# Patient Record
Sex: FEMALE | Race: WHITE | NOT HISPANIC OR LATINO | Employment: UNEMPLOYED | URBAN - METROPOLITAN AREA
[De-identification: names, ages, dates, MRNs, and addresses within clinical notes are randomized per-mention and may not be internally consistent; named-entity substitution may affect disease eponyms.]

---

## 2017-06-15 ENCOUNTER — APPOINTMENT (OUTPATIENT)
Dept: RADIOLOGY | Facility: IMAGING CENTER | Age: 52
End: 2017-06-15
Attending: PHYSICIAN ASSISTANT
Payer: COMMERCIAL

## 2017-06-15 ENCOUNTER — OFFICE VISIT (OUTPATIENT)
Dept: URGENT CARE | Facility: CLINIC | Age: 52
End: 2017-06-15
Payer: COMMERCIAL

## 2017-06-15 VITALS
WEIGHT: 221 LBS | DIASTOLIC BLOOD PRESSURE: 82 MMHG | SYSTOLIC BLOOD PRESSURE: 114 MMHG | OXYGEN SATURATION: 97 % | RESPIRATION RATE: 16 BRPM | HEART RATE: 95 BPM | BODY MASS INDEX: 33.61 KG/M2 | TEMPERATURE: 99 F

## 2017-06-15 DIAGNOSIS — S92.424A CLOSED NONDISPLACED FRACTURE OF DISTAL PHALANX OF RIGHT GREAT TOE, INITIAL ENCOUNTER: ICD-10-CM

## 2017-06-15 DIAGNOSIS — S96.919A STRAIN OF FOOT, UNSPECIFIED LATERALITY, INITIAL ENCOUNTER: ICD-10-CM

## 2017-06-15 PROCEDURE — L9900 O&P SUPPLY/ACCESSORY/SERVICE: HCPCS | Performed by: PHYSICIAN ASSISTANT

## 2017-06-15 PROCEDURE — 73630 X-RAY EXAM OF FOOT: CPT | Mod: 26,RT | Performed by: PHYSICIAN ASSISTANT

## 2017-06-15 PROCEDURE — 99203 OFFICE O/P NEW LOW 30 MIN: CPT | Performed by: PHYSICIAN ASSISTANT

## 2017-06-15 RX ORDER — ERGOCALCIFEROL 1.25 MG/1
CAPSULE ORAL
COMMUNITY

## 2017-06-15 RX ORDER — MINOCYCLINE HYDROCHLORIDE 100 MG/1
100 CAPSULE ORAL 2 TIMES DAILY
COMMUNITY

## 2017-06-15 RX ORDER — OMEPRAZOLE 20 MG/1
20 CAPSULE, DELAYED RELEASE ORAL DAILY
COMMUNITY

## 2017-06-15 ASSESSMENT — ENCOUNTER SYMPTOMS
TINGLING: 0
VOMITING: 0
FEVER: 0
SENSORY CHANGE: 0
CHILLS: 0
NAUSEA: 0

## 2017-06-15 NOTE — MR AVS SNAPSHOT
Shaq Lane   6/15/2017 6:30 PM   Office Visit   MRN: [id number]    Department:  [facility]   Dept Phone:  [phone]    Description:  Female : 1965   Provider:  Ramos Mcgraw PA-C           Reason for Visit     Toe Injury x today,   Rt. toe injury      Allergies as of 6/15/2017     Allergen Noted Reactions    Amoxicillin 2015   Unspecified    Stomach pain   TOLERATED AMPICILLIN in the past    Vicodin [Hydrocodone-Acetaminophen] 2015   Rash    rash      You were diagnosed with     Strain of foot, unspecified laterality, initial encounter   [242081]         Vital Signs     Blood Pressure Pulse Temperature Respirations Weight Oxygen Saturation    114/82 mmHg 95 37.2 °C (99 °F) 16 100.245 kg (221 lb) 97%    Smoking Status                   Never Smoker            Basic Information     Date Of Birth Sex Race Ethnicity Preferred Language    1965 Female White Non- English      Problem List              ICD-10-CM Priority Class Noted - Resolved    Chest pain R07.9   2015 - Present      Health Maintenance     Patient has no pending health maintenance at this time      Current Immunizations     No immunizations on file.      Below and/or attached are the medications your provider expects you to take. Review all of your home medications and newly ordered medications with your provider and/or pharmacist. Follow medication instructions as directed by your provider and/or pharmacist. Please keep your medication list with you and share with your provider. Update the information when medications are discontinued, doses are changed, or new medications (including over-the-counter products) are added; and carry medication information at all times in the event of emergency situations     Allergies:  AMOXICILLIN - Unspecified     VICODIN - Rash               Medications  Valid as of: Cris 15, 2017 -  7:20 PM    Generic Name Brand Name Tablet Size Instructions for use      Clindamycin HCl   Take  by mouth.        Ergocalciferol (Cap) DRISDOL 97847 UNITS Take  by mouth every 7 days.        Iron Combinations   Take  by mouth.        LORazepam (Tab) ATIVAN 1 MG Take 1 mg by mouth every bedtime.        Minocycline HCl (Cap) MINOCIN 100 MG Take 100 mg by mouth 2 times a day.        Omeprazole (CAPSULE DELAYED RELEASE) PRILOSEC 20 MG Take 20 mg by mouth every day.        .                 Medicines prescribed today were sent to:     [address] - [address] [place] [address]    Phone: [phone] Fax: [phone]    Open 24 Hours?: No      Medication refill instructions:       If your prescription bottle indicates you have medication refills left, it is not necessary to call your provider’s office. Please contact your pharmacy and they will refill your medication.    If your prescription bottle indicates you do not have any refills left, you may request refills at any time through one of the following ways: The online [employer] system (except Urgent Care), by calling your provider’s office, or by asking your pharmacy to contact your provider’s office with a refill request. Medication refills are processed only during regular business hours and may not be available until the next business day. Your provider may request additional information or to have a follow-up visit with you prior to refilling your medication.   *Please Note: Medication refills are assigned a new Rx number when refilled electronically. Your pharmacy may indicate that no refills were authorized even though a new prescription for the same medication is available at the pharmacy. Please request the medicine by name with the pharmacy before contacting your provider for a refill.        Your To Do List     Future Labs/Procedures Complete By Expires    DX-FOOT-COMPLETE 3+ RIGHT  As directed 6/15/2018         [employer] Access Code: [email or web address]  Expires: 7/15/2017  7:20  PM    [employer]  A secure, online tool to manage your health information     [employer]’s A vida Ã[employer] feita de [employer]® is a secure, online tool that connects you to your personalized health information from the privacy of your home -- day or night - making it very easy for you to manage your healthcare. Once the activation process is completed, you can even access your medical information using the A vida Ã[employer] feita de [employer] renetta, which is available for free in the [employer] store or Google Play store.     A vida [employer] feita de [employer] provides the following levels of access (as shown below):   My Chart Features   [facility] Doctor [facility]  Specialists [facility]  Urgent  Care Non-[facility]  Primary Care  Doctor   Email your healthcare team securely and privately 24/7 X X X    Manage appointments: schedule your next appointment; view details of past/upcoming appointments X      Request prescription refills. X      View recent personal medical records, including lab and immunizations X X X X   View health record, including health history, allergies, medications X X X X   Read reports about your outpatient visits, procedures, consult and ER notes X X X X   See your discharge summary, which is a recap of your hospital and/or ER visit that includes your diagnosis, lab results, and care plan. X X       How to register for A vida [employer] feita de [employer]:  1. Go to  [email or web address].  2. Click on the Sign Up Now box, which takes you to the New Member Sign Up page. You will need to provide the following information:  a. Enter your [employer] Access Code exactly as it appears at the top of this page. (You will not need to use this code after you’ve completed the sign-up process. If you do not sign up before the expiration date, you must request a new code.)   b. Enter your date of birth.   c. Enter your home email address.   d. Click Submit, and follow the next screen’s instructions.  3. Create a A vida [employer] feita de [employer] ID. This will be your A vida [employer] feita [employer] login ID and cannot be changed, so think of one that is secure and  easy to remember.  4. Create a Level password. You can change your password at any time.  5. Enter your Password Reset Question and Answer. This can be used at a later time if you forget your password.   6. Enter your e-mail address. This allows you to receive e-mail notifications when new information is available in Level.  7. Click Sign Up. You can now view your health information.    For assistance activating your Level account, call [phone]

## 2017-06-16 RX ORDER — TRAMADOL HYDROCHLORIDE 50 MG/1
50 TABLET ORAL EVERY 4 HOURS PRN
Qty: 20 TAB | Refills: 0 | Status: SHIPPED | OUTPATIENT
Start: 2017-06-16

## 2017-06-16 NOTE — PROGRESS NOTES
Subjective:      Shaq Lane is a 51 y.o. female who presents with Toe Injury            Toe Injury  Pertinent negatives include no chills, fever, nausea or vomiting.    patient notes today, had a trip and fall injury. She tripped catching her right foot on a concrete step while wearing flip-flops. She had an axial loading injury to right great toe. Complains of pain with motion to the right great toe. She denies any history of surgery or significant injuries to this right foot. She notes she has been walking with very mild limp since injury. She has tried no treatments for this. She reports normal sensation to foot. She notes past medical history scleroderma and poor circulation, in general.    Review of Systems   Constitutional: Negative for fever and chills.   Gastrointestinal: Negative for nausea and vomiting.   Musculoskeletal: Positive for joint pain ( POS for pain to right grea toe).   Neurological: Negative for tingling and sensory change.       PMH:  has no past medical history on file.  MEDS:   Current outpatient prescriptions:   •  lorazepam (ATIVAN) 1 MG Tab, Take 1 mg by mouth every bedtime., Disp: , Rfl:   ALLERGIES:   Allergies   Allergen Reactions   • Amoxicillin Unspecified     Stomach pain   TOLERATED AMPICILLIN in the past   • Vicodin [Hydrocodone-Acetaminophen] Rash     rash     SURGHX: No past surgical history on file.  SOCHX:  reports that she has never smoked. She does not have any smokeless tobacco history on file. She reports that she does not drink alcohol or use illicit drugs.  FH: Family history was reviewed, no pertinent findings to report    I have worn a mask for the entire encounter with this patient.      Objective:     /82 mmHg  Pulse 95  Temp(Src) 37.2 °C (99 °F)  Resp 16  Wt 100.245 kg (221 lb)  SpO2 97%      Physical Exam   Constitutional: She is oriented to person, place, and time. She appears well-developed and well-nourished. No distress.   HENT:   Head:  Normocephalic and atraumatic.   Right Ear: External ear normal.   Left Ear: External ear normal.   Nose: Nose normal.   Eyes: Conjunctivae and lids are normal. Right eye exhibits no discharge. Left eye exhibits no discharge. No scleral icterus.   Neck: Neck supple.   Pulmonary/Chest: Effort normal. No respiratory distress.   Musculoskeletal:        Right foot: There is decreased range of motion ( limited in dorsiflexion, plantar seems full) and tenderness ( mild near IPJ). There is no swelling, normal capillary refill ( brisk, less than 1second), no crepitus, no deformity and no laceration.        Feet:    Neurological: She is alert and oriented to person, place, and time. She has normal strength. She is not disoriented. No sensory deficit. Gait ( antalgic) abnormal. Coordination normal.   Skin: Skin is warm and dry. She is not diaphoretic. No erythema. No pallor.   Psychiatric: Her speech is normal and behavior is normal.   Nursing note and vitals reviewed.        dx foot -   6/15/2017 6:59 PM    HISTORY/REASON FOR EXAM:  Pain/Deformity Following Trauma  fall yesterday and injured her right foot, distal first metatarsal area and great toe      TECHNIQUE/EXAM DESCRIPTION AND NUMBER OF VIEWS:  3 views of the RIGHT foot.    COMPARISON:  None.    FINDINGS:    Diffuse osseous demineralization, limiting evaluation for nondisplaced fracture.    Comminuted nondisplaced fracture of the first distal phalanx extending to the first IP joint.    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    Plantar and dorsal calcaneal enthesophyte.    Bipartite hallux sesamoids. The tibial sesamoid appears slightly irregular and additional fracture cannot be excluded.           Impression          Comminuted nondisplaced fracture of the first distal phalanx extending to the first IP joint.    Bipartite hallux sesamoids. The tibial sesamoid appears slightly irregular and additional fracture cannot be excluded.         Reading Provider Reading  Date     Harris Altamirano M.D. Toño 15, 2017         Signing Provider Signing Date Signing Time     Harris Altamirano M.D. Toño 15, 2017  7:09 PM              Assessment/Plan:     1. Closed nondisplaced fracture of distal phalanx of right great toe, initial encounter  Recommend conservative care, rest, ice, elevation, work on gentle ROM exercises  Return to clinic with lack of resolution or progression of symptoms.  Patient is here from out of town. She is here working on a remodel. She plans to travel tomorrow back to the [place] area where she will follow-up with an orthopedist. She declines a referral locally. We discussed her weightbearing only on her heel with a postop shoe to allow her some functional mobility and to travel home. She declines pain meds but will contact clinic tomorrow prior to departure if desired.      - DX-FOOT-COMPLETE 3+ RIGHT; Future